# Patient Record
Sex: FEMALE | Race: WHITE | NOT HISPANIC OR LATINO | Employment: UNEMPLOYED | URBAN - NONMETROPOLITAN AREA
[De-identification: names, ages, dates, MRNs, and addresses within clinical notes are randomized per-mention and may not be internally consistent; named-entity substitution may affect disease eponyms.]

---

## 2017-04-23 ENCOUNTER — HOSPITAL ENCOUNTER (EMERGENCY)
Facility: HOSPITAL | Age: 32
Discharge: HOME | End: 2017-04-23
Attending: EMERGENCY MEDICINE | Admitting: EMERGENCY MEDICINE

## 2017-04-23 VITALS
DIASTOLIC BLOOD PRESSURE: 79 MMHG | HEART RATE: 78 BPM | HEIGHT: 60 IN | WEIGHT: 150 LBS | RESPIRATION RATE: 18 BRPM | TEMPERATURE: 97.7 F | SYSTOLIC BLOOD PRESSURE: 131 MMHG | BODY MASS INDEX: 29.45 KG/M2 | OXYGEN SATURATION: 99 %

## 2017-04-23 DIAGNOSIS — H11.32 SUBCONJUNCTIVAL HEMORRHAGE, LEFT: ICD-10-CM

## 2017-04-23 DIAGNOSIS — S05.02XA CORNEAL ABRASION, LEFT, INITIAL ENCOUNTER: Primary | ICD-10-CM

## 2017-04-23 PROCEDURE — 99282 EMERGENCY DEPT VISIT SF MDM: CPT

## 2017-04-23 RX ORDER — TETRACAINE HYDROCHLORIDE 5 MG/ML
2 SOLUTION OPHTHALMIC ONCE
Status: DISCONTINUED | OUTPATIENT
Start: 2017-04-23 | End: 2017-04-23 | Stop reason: HOSPADM

## 2017-04-23 RX ORDER — CIPROFLOXACIN HYDROCHLORIDE 3.5 MG/ML
1 SOLUTION/ DROPS TOPICAL 4 TIMES DAILY
Qty: 5 ML | Refills: 0 | Status: SHIPPED | OUTPATIENT
Start: 2017-04-23 | End: 2017-04-30

## 2017-04-23 RX ORDER — ERYTHROMYCIN 5 MG/G
OINTMENT OPHTHALMIC NIGHTLY
Qty: 1 G | Refills: 0 | Status: SHIPPED | OUTPATIENT
Start: 2017-04-23 | End: 2017-04-30

## 2017-04-23 NOTE — ED PROVIDER NOTES
Subjective   HPI Comments: 31-year-old female presenting with eye injury.  She states that prior to arrival she was laying back against a window when a mini blind fell striking her left eye.  Since then she's had pain to the eye, foreign body sensation, tearing.  Her vision is normal.  She denies any other complaints or concerns.  She has no facial pain.      Review of Systems   Constitutional: Negative for chills and fever.   HENT: Negative for congestion, rhinorrhea and sore throat.    Eyes: Positive for pain and redness. Negative for photophobia and visual disturbance.   Respiratory: Negative for cough and shortness of breath.    Cardiovascular: Negative for chest pain, palpitations and leg swelling.   Gastrointestinal: Negative for abdominal pain, diarrhea, nausea and vomiting.   Genitourinary: Negative for dysuria.   Musculoskeletal: Negative for arthralgias.   Skin: Negative for rash.   Neurological: Negative for weakness and numbness.   Psychiatric/Behavioral: Negative for behavioral problems.       Past Medical History:   Diagnosis Date   • ADD (attention deficit disorder)    • Depression        No Known Allergies    Past Surgical History:   Procedure Laterality Date   •  SECTION     • ORIF TIBIA FRACTURE Left        History reviewed. No pertinent family history.    Social History     Social History   • Marital status:      Spouse name: N/A   • Number of children: N/A   • Years of education: N/A     Social History Main Topics   • Smoking status: Current Every Day Smoker     Packs/day: 0.50     Types: Cigarettes   • Smokeless tobacco: None   • Alcohol use Yes      Comment: SOCIALLY    • Drug use: No   • Sexual activity: Not Asked     Other Topics Concern   • None     Social History Narrative   • None           Objective   Physical Exam   Constitutional: She is oriented to person, place, and time. She appears well-developed and well-nourished. No distress.   HENT:   Head: Normocephalic and  atraumatic.   Right Ear: External ear normal.   Left Ear: External ear normal.   Nose: Nose normal.   Mouth/Throat: Oropharynx is clear and moist.   Orbits and midface stable   Eyes: EOM are normal. Pupils are equal, round, and reactive to light.   Right eye is normal, left eye with subconjunctival hemorrhage medially, there is a triangular shaped corneal abrasion inferiorly, negative Tri sign, she had pain relief with tetracaine, lids flipped no foreign bodies noted   Neck: Normal range of motion. Neck supple.   Cardiovascular: Normal rate, regular rhythm, normal heart sounds and intact distal pulses.    Pulmonary/Chest: Effort normal and breath sounds normal. No respiratory distress.   Abdominal: Soft. Bowel sounds are normal. She exhibits no distension. There is no tenderness. There is no rebound and no guarding.   Musculoskeletal: Normal range of motion. She exhibits no edema, tenderness or deformity.   Neurological: She is alert and oriented to person, place, and time.   Skin: Skin is warm and dry. No rash noted.   Small contusion to the bridge of the nose   Psychiatric: She has a normal mood and affect. Her behavior is normal.   Nursing note and vitals reviewed.      Procedures         ED Course  ED Course                  MDM  Number of Diagnoses or Management Options  Corneal abrasion, left, initial encounter:   Subconjunctival hemorrhage, left:   Diagnosis management comments: 31-year-old female with eye injury.  Well-developed, well-nourished female in no distress with exam as above.  We'll treat with antibiotic ointment/drops for the corneal abrasion.  We'll have her follow-up with ophthalmology tomorrow or Tuesday at the latest.  Return precautions discussed.  She is comfortable with and understanding of the plan.  -eye drops    Ddx: eye injury, corneal abrasion, subconjunctival hemorrhage      Final diagnoses:   Corneal abrasion, left, initial encounter   Subconjunctival hemorrhage, left             Veto Hough MD  04/23/17 9435

## 2017-04-23 NOTE — DISCHARGE INSTRUCTIONS

## 2017-07-06 ENCOUNTER — HOSPITAL ENCOUNTER (EMERGENCY)
Facility: HOSPITAL | Age: 32
Discharge: HOME | End: 2017-07-06
Attending: EMERGENCY MEDICINE | Admitting: EMERGENCY MEDICINE

## 2017-07-06 ENCOUNTER — APPOINTMENT (OUTPATIENT)
Dept: GENERAL RADIOLOGY | Facility: HOSPITAL | Age: 32
End: 2017-07-06

## 2017-07-06 VITALS
HEART RATE: 108 BPM | WEIGHT: 145 LBS | TEMPERATURE: 99 F | RESPIRATION RATE: 18 BRPM | BODY MASS INDEX: 28.47 KG/M2 | OXYGEN SATURATION: 98 % | SYSTOLIC BLOOD PRESSURE: 137 MMHG | HEIGHT: 60 IN | DIASTOLIC BLOOD PRESSURE: 73 MMHG

## 2017-07-06 DIAGNOSIS — M54.16 LEFT LUMBAR RADICULOPATHY: Primary | ICD-10-CM

## 2017-07-06 PROCEDURE — 72100 X-RAY EXAM L-S SPINE 2/3 VWS: CPT

## 2017-07-06 PROCEDURE — 99283 EMERGENCY DEPT VISIT LOW MDM: CPT

## 2017-07-06 RX ORDER — NAPROXEN 500 MG/1
500 TABLET ORAL 2 TIMES DAILY PRN
Qty: 14 TABLET | Refills: 0 | Status: SHIPPED | OUTPATIENT
Start: 2017-07-06 | End: 2019-04-18 | Stop reason: HOSPADM

## 2017-07-06 RX ORDER — CYCLOBENZAPRINE HCL 10 MG
10 TABLET ORAL 3 TIMES DAILY
Qty: 15 TABLET | Refills: 0 | Status: SHIPPED | OUTPATIENT
Start: 2017-07-06 | End: 2019-04-18 | Stop reason: HOSPADM

## 2017-07-06 NOTE — ED PROVIDER NOTES
Subjective   HPI Comments: 31-year-old female presents with low back pain for 2 weeks.  She states the pain radiates down her left buttocks and left leg.  She states that she was running several weeks ago and felt a pull in her lower back area and also radiating to her left leg.  No loss of bowel or bladder function no fever no chills no urinary symptoms.      History provided by:  Patient   used: No        Review of Systems   Musculoskeletal: Positive for back pain.   All other systems reviewed and are negative.      Past Medical History:   Diagnosis Date   • ADD (attention deficit disorder)    • Depression        No Known Allergies    Past Surgical History:   Procedure Laterality Date   •  SECTION     • ORIF TIBIA FRACTURE Left        History reviewed. No pertinent family history.    Social History     Social History   • Marital status:      Spouse name: N/A   • Number of children: N/A   • Years of education: N/A     Social History Main Topics   • Smoking status: Current Every Day Smoker     Packs/day: 0.50     Types: Cigarettes   • Smokeless tobacco: None   • Alcohol use Yes      Comment: SOCIALLY    • Drug use: No   • Sexual activity: Yes     Other Topics Concern   • None     Social History Narrative   • None           Objective   Physical Exam   Constitutional: She is oriented to person, place, and time. She appears well-developed and well-nourished.   HENT:   Head: Normocephalic.   Eyes: EOM are normal. Pupils are equal, round, and reactive to light.   Neck: Normal range of motion. Neck supple.   Cardiovascular: Normal rate and regular rhythm.    Pulmonary/Chest: Effort normal and breath sounds normal.   Abdominal: Soft. Bowel sounds are normal.   Musculoskeletal: Normal range of motion.   Neurological: She is alert and oriented to person, place, and time. She has normal reflexes.   Skin: Skin is warm and dry.   Psychiatric: She has a normal mood and affect.   Nursing note  and vitals reviewed.      Procedures         ED Course  ED Course                  MDM    Final diagnoses:   Left lumbar radiculopathy            Rashad Rodriguez Jr., PA-C  07/06/17 1538

## 2017-07-12 ENCOUNTER — HOSPITAL ENCOUNTER (EMERGENCY)
Facility: HOSPITAL | Age: 32
Discharge: HOME | End: 2017-07-12
Attending: EMERGENCY MEDICINE | Admitting: EMERGENCY MEDICINE

## 2017-07-12 ENCOUNTER — APPOINTMENT (OUTPATIENT)
Dept: GENERAL RADIOLOGY | Facility: HOSPITAL | Age: 32
End: 2017-07-12

## 2017-07-12 VITALS
RESPIRATION RATE: 22 BRPM | DIASTOLIC BLOOD PRESSURE: 82 MMHG | TEMPERATURE: 98.2 F | OXYGEN SATURATION: 100 % | HEIGHT: 62 IN | SYSTOLIC BLOOD PRESSURE: 112 MMHG | WEIGHT: 150 LBS | HEART RATE: 68 BPM | BODY MASS INDEX: 27.6 KG/M2

## 2017-07-12 DIAGNOSIS — M79.605 LEG PAIN, POSTERIOR, LEFT: ICD-10-CM

## 2017-07-12 DIAGNOSIS — S39.012D LUMBAR STRAIN, SUBSEQUENT ENCOUNTER: Primary | ICD-10-CM

## 2017-07-12 PROCEDURE — 25010000002 KETOROLAC TROMETHAMINE PER 15 MG: Performed by: PHYSICIAN ASSISTANT

## 2017-07-12 PROCEDURE — 73590 X-RAY EXAM OF LOWER LEG: CPT

## 2017-07-12 PROCEDURE — 73552 X-RAY EXAM OF FEMUR 2/>: CPT

## 2017-07-12 PROCEDURE — 99283 EMERGENCY DEPT VISIT LOW MDM: CPT

## 2017-07-12 PROCEDURE — 96372 THER/PROPH/DIAG INJ SC/IM: CPT

## 2017-07-12 PROCEDURE — 25010000002 PROMETHAZINE PER 50 MG: Performed by: EMERGENCY MEDICINE

## 2017-07-12 PROCEDURE — 25010000002 MORPHINE PER 10 MG: Performed by: EMERGENCY MEDICINE

## 2017-07-12 RX ORDER — MORPHINE SULFATE 4 MG/ML
4 INJECTION, SOLUTION INTRAMUSCULAR; INTRAVENOUS ONCE
Status: COMPLETED | OUTPATIENT
Start: 2017-07-12 | End: 2017-07-12

## 2017-07-12 RX ORDER — KETOROLAC TROMETHAMINE 30 MG/ML
60 INJECTION, SOLUTION INTRAMUSCULAR; INTRAVENOUS ONCE
Status: COMPLETED | OUTPATIENT
Start: 2017-07-12 | End: 2017-07-12

## 2017-07-12 RX ORDER — PREDNISONE 20 MG/1
40 TABLET ORAL DAILY
Qty: 10 TABLET | Refills: 0 | Status: SHIPPED | OUTPATIENT
Start: 2017-07-12 | End: 2019-04-18 | Stop reason: HOSPADM

## 2017-07-12 RX ORDER — METAXALONE 800 MG/1
800 TABLET ORAL NIGHTLY PRN
Qty: 10 TABLET | Refills: 0 | Status: SHIPPED | OUTPATIENT
Start: 2017-07-12 | End: 2019-04-18 | Stop reason: HOSPADM

## 2017-07-12 RX ORDER — MELOXICAM 15 MG/1
15 TABLET ORAL DAILY
Qty: 10 TABLET | Refills: 0 | Status: SHIPPED | OUTPATIENT
Start: 2017-07-12 | End: 2019-04-18 | Stop reason: HOSPADM

## 2017-07-12 RX ORDER — PROMETHAZINE HYDROCHLORIDE 25 MG/ML
25 INJECTION, SOLUTION INTRAMUSCULAR; INTRAVENOUS ONCE
Status: COMPLETED | OUTPATIENT
Start: 2017-07-12 | End: 2017-07-12

## 2017-07-12 RX ADMIN — MORPHINE SULFATE 4 MG: 4 INJECTION, SOLUTION INTRAMUSCULAR; INTRAVENOUS at 15:11

## 2017-07-12 RX ADMIN — PROMETHAZINE HYDROCHLORIDE 25 MG: 25 INJECTION INTRAMUSCULAR; INTRAVENOUS at 15:11

## 2017-07-12 RX ADMIN — KETOROLAC TROMETHAMINE 60 MG: 60 INJECTION, SOLUTION INTRAMUSCULAR at 14:25

## 2017-07-12 NOTE — DISCHARGE INSTRUCTIONS
As mentioned to you, this could be sciatica from your back (A pinched nerve from your back).  You may need to have an MRI done of your back to figure out the cause of your pain.  You should follow-up with your family doctor or Dr. Marcus, orthopedic specialist, in one to 2 days for further workup, treatment and evaluation.      Discontinue naproxen and Flexeril as we are switching you to new medications.      No running, jumping, jogging, sports or heavy lifting until cleared by your regular doctors.      As mentioned to you in the ER today, you have a broken screw in your lower leg.  You will need to see Dr. Marcus, orthopedic specialist, or the [facility] for further workup, treatment and evaluation of this problem.  Call for appointments.

## 2017-07-12 NOTE — ED PROVIDER NOTES
Subjective   HPI Comments: 31-year-old female apparently was jogging about 4 weeks ago and thinks she may have torn her hamstrings muscle.  She is here with aching/burning, severe pain in her left lumbar region, left buttock, left posterior thigh and left lower leg for the past 4 weeks, progressively worsening.  No incontinence, saddle anesthesia or urinary retention.  No IV drug use.  No fever.  No previous history of back problems.  She has had her left lower leg operated on in the past for a fracture requiring hardware at .  This was performed in .  No new injury since that time.  She was seen here on 17 by Rashad Rodriguez and written a prescription for naproxen and Flexeril and diagnosed with lumbar radiculopathy after having a negative L-spine x-ray.  She did not follow-up with her family doctor as directed.    Aggravating factors: Movement.  Alleviating factors: Laying on her right side.  Treatment prior to arrival: Naproxen, Flexeril, seen in our ER and had a negative back x-ray on 17.  LMP 3 weeks ago.  Denies pregnancy.      History provided by:  Patient  History limited by:  Acuity of condition   used: No        Review of Systems   Constitutional: Negative.    HENT: Negative.    Eyes: Negative.    Respiratory: Negative.    Cardiovascular: Negative.  Negative for chest pain.   Gastrointestinal: Negative.  Negative for abdominal pain.   Endocrine: Negative.    Genitourinary: Negative for dysuria.   Musculoskeletal: Positive for arthralgias, back pain and myalgias.   Skin: Negative.    Allergic/Immunologic: Negative.    Neurological: Negative.    Hematological: Negative.    Psychiatric/Behavioral: Negative.    All other systems reviewed and are negative.      Past Medical History:   Diagnosis Date   • ADD (attention deficit disorder)    • Depression        No Known Allergies    Past Surgical History:   Procedure Laterality Date   •  SECTION     • ORIF TIBIA FRACTURE  Left        History reviewed. No pertinent family history.    Social History     Social History   • Marital status:      Spouse name: N/A   • Number of children: N/A   • Years of education: N/A     Social History Main Topics   • Smoking status: Current Every Day Smoker     Packs/day: 0.50     Types: Cigarettes   • Smokeless tobacco: None   • Alcohol use Yes      Comment: SOCIALLY    • Drug use: No   • Sexual activity: Yes     Other Topics Concern   • None     Social History Narrative           Objective   Physical Exam   Constitutional: She is oriented to person, place, and time. She appears well-developed and well-nourished. No distress.   HENT:   Head: Normocephalic and atraumatic.   Right Ear: External ear normal.   Left Ear: External ear normal.   Eyes: EOM are normal. Pupils are equal, round, and reactive to light.   Neck: Normal range of motion. Neck supple.   Cardiovascular: Normal rate, regular rhythm and normal heart sounds.    Pulmonary/Chest: Effort normal and breath sounds normal. No stridor. She has no wheezes. She exhibits no tenderness.   Musculoskeletal: Normal range of motion. She exhibits no edema.   The left sacroiliac joint, left hamstrings and left lower leg are all tender to palpation without muscle shortening, deformity, rash, redness or heat.  Neurovascular and skin are intact to the left lower extremity.  Leg examined with shanna Wilkins present.   Neurological: She is alert and oriented to person, place, and time.   Intact motor, sensation, strength to bilateral lower extremity.   Skin: Skin is warm and dry. No rash noted. She is not diaphoretic.   Psychiatric: She has a normal mood and affect. Her behavior is normal. Judgment and thought content normal.   Nursing note and vitals reviewed.      Procedures         ED Course  ED Course                  MDM  Number of Diagnoses or Management Options  Leg pain, posterior, left: new and requires workup  Lumbar strain, subsequent encounter:  new and requires workup     Amount and/or Complexity of Data Reviewed  Tests in the radiology section of CPT®: reviewed and ordered  Discuss the patient with other providers: yes    Risk of Complications, Morbidity, and/or Mortality  Presenting problems: moderate  Diagnostic procedures: low  Management options: moderate  General comments: I explained to the patient that this could be sciatica from her back or simply a muscular strain.  I also explained to her that this could be the broken screw in her lower leg causing some of this discomfort.  She understands that she needs to see her family doctor and Dr. Marcus, orthopedic specialist, for further workup, treatment and evaluation which may need to include an MRI.    Patient Progress  Patient progress: stable      Final diagnoses:   Lumbar strain, subsequent encounter   Leg pain, posterior, left            Sammi Damon PA-C  07/12/17 1635

## 2017-07-16 ENCOUNTER — HOSPITAL ENCOUNTER (EMERGENCY)
Facility: HOSPITAL | Age: 32
Discharge: HOME | End: 2017-07-16
Attending: EMERGENCY MEDICINE | Admitting: EMERGENCY MEDICINE

## 2017-07-16 VITALS
DIASTOLIC BLOOD PRESSURE: 50 MMHG | SYSTOLIC BLOOD PRESSURE: 130 MMHG | HEART RATE: 80 BPM | TEMPERATURE: 98.2 F | WEIGHT: 145 LBS | HEIGHT: 60 IN | RESPIRATION RATE: 18 BRPM | OXYGEN SATURATION: 98 % | BODY MASS INDEX: 28.47 KG/M2

## 2017-07-16 DIAGNOSIS — M79.605 PAIN OF LEFT LOWER EXTREMITY: Primary | ICD-10-CM

## 2017-07-16 PROCEDURE — 96372 THER/PROPH/DIAG INJ SC/IM: CPT

## 2017-07-16 PROCEDURE — 25010000002 HALOPERIDOL LACTATE PER 5 MG: Performed by: EMERGENCY MEDICINE

## 2017-07-16 PROCEDURE — 99283 EMERGENCY DEPT VISIT LOW MDM: CPT

## 2017-07-16 PROCEDURE — 25010000002 KETOROLAC TROMETHAMINE PER 15 MG: Performed by: EMERGENCY MEDICINE

## 2017-07-16 PROCEDURE — 25010000002 METHYLPREDNISOLONE PER 125 MG: Performed by: EMERGENCY MEDICINE

## 2017-07-16 RX ORDER — HALOPERIDOL 5 MG/ML
5 INJECTION INTRAMUSCULAR ONCE
Status: COMPLETED | OUTPATIENT
Start: 2017-07-16 | End: 2017-07-16

## 2017-07-16 RX ORDER — METHOCARBAMOL 500 MG/1
500 TABLET, FILM COATED ORAL 3 TIMES DAILY PRN
Qty: 21 TABLET | Refills: 0 | Status: SHIPPED | OUTPATIENT
Start: 2017-07-16 | End: 2019-04-18 | Stop reason: HOSPADM

## 2017-07-16 RX ORDER — IBUPROFEN 600 MG/1
600 TABLET ORAL EVERY 8 HOURS PRN
Qty: 21 TABLET | Refills: 0 | Status: SHIPPED | OUTPATIENT
Start: 2017-07-16 | End: 2019-04-18 | Stop reason: HOSPADM

## 2017-07-16 RX ORDER — METHYLPREDNISOLONE SODIUM SUCCINATE 125 MG/2ML
80 INJECTION, POWDER, LYOPHILIZED, FOR SOLUTION INTRAMUSCULAR; INTRAVENOUS ONCE
Status: COMPLETED | OUTPATIENT
Start: 2017-07-16 | End: 2017-07-16

## 2017-07-16 RX ORDER — METHYLPREDNISOLONE 4 MG/1
TABLET ORAL
Qty: 21 TABLET | Refills: 0 | Status: SHIPPED | OUTPATIENT
Start: 2017-07-16 | End: 2019-04-18 | Stop reason: HOSPADM

## 2017-07-16 RX ORDER — KETOROLAC TROMETHAMINE 30 MG/ML
60 INJECTION, SOLUTION INTRAMUSCULAR; INTRAVENOUS ONCE
Status: COMPLETED | OUTPATIENT
Start: 2017-07-16 | End: 2017-07-16

## 2017-07-16 RX ADMIN — METHYLPREDNISOLONE SODIUM SUCCINATE 80 MG: 125 INJECTION, POWDER, FOR SOLUTION INTRAMUSCULAR; INTRAVENOUS at 13:13

## 2017-07-16 RX ADMIN — HALOPERIDOL LACTATE 5 MG: 5 INJECTION, SOLUTION INTRAMUSCULAR at 13:18

## 2017-07-16 RX ADMIN — KETOROLAC TROMETHAMINE 60 MG: 60 INJECTION, SOLUTION INTRAMUSCULAR at 13:14

## 2017-07-16 NOTE — ED PROVIDER NOTES
TRIAGE CHIEF COMPLAINT:     Nursing and triage notes reviewed    Chief Complaint   Patient presents with   • Leg Pain      HPI: Alejandra Torres is a 31 y.o. female who presents to the emergency department complaining of Pain in her left thigh.  Patient describes a sharp and burning type pain in her posterior lateral left thigh.  Symptoms are made worse by trying to walk or bending her leg at the knee joint.  Pain radiates up and down her left leg from her left hip to her left knee.  Patient was seen in the emergency department 2 times in the last week for similar symptoms.  X-rays at that time revealed a loose screw and some hardware which is an old finding.  No other abnormalities were seen.  Patient had been referred to orthopedics for further evaluation but had not followed up.  She is not having numbness or tingling in her lower extremity.  Denies history of injury.  Patient states pain started initially when she was running.  States the medicine has helped her thus far.     REVIEW OF SYSTEMS: Otherwise negative unless stated above     PAST MEDICAL HISTORY:   Past Medical History:   Diagnosis Date   • ADD (attention deficit disorder)    • Depression         FAMILY HISTORY:   History reviewed. No pertinent family history.     SOCIAL HISTORY:   Social History     Social History   • Marital status:      Spouse name: N/A   • Number of children: N/A   • Years of education: N/A     Occupational History   • Not on file.     Social History Main Topics   • Smoking status: Current Every Day Smoker     Packs/day: 0.50     Types: Cigarettes   • Smokeless tobacco: Not on file   • Alcohol use Yes      Comment: SOCIALLY    • Drug use: No   • Sexual activity: Yes     Other Topics Concern   • Not on file     Social History Narrative        SURGICAL HISTORY:   Past Surgical History:   Procedure Laterality Date   •  SECTION     • ORIF TIBIA FRACTURE Left         CURRENT MEDICATIONS:      Medication List      ASK  your doctor about these medications          cyclobenzaprine 10 MG tablet   Commonly known as:  FLEXERIL   Take 1 tablet by mouth 3 (Three) Times a Day.       meloxicam 15 MG tablet   Commonly known as:  MOBIC   Take 1 tablet by mouth Daily.       metaxalone 800 MG tablet   Commonly known as:  SKELAXIN   Take 1 tablet by mouth At Night As Needed for Muscle Spasms.       naproxen 500 MG tablet   Commonly known as:  NAPROSYN   Take 1 tablet by mouth 2 (Two) Times a Day As Needed for Mild Pain (1-3)   for up to 14 doses.       predniSONE 20 MG tablet   Commonly known as:  DELTASONE   Take 2 tablets by mouth Daily.            ALLERGIES: Review of patient's allergies indicates no known allergies.     PHYSICAL EXAM:   VITAL SIGNS:   Vitals:    07/16/17 1208   BP: 136/48   Pulse: 104   Resp: 18   Temp: 98.5 °F (36.9 °C)   SpO2: 98%      CONSTITUTIONAL: Awake, oriented, appears non-toxic   HENT: Atraumatic, normocephalic, oral mucosa pink and moist, airway patent.  EYES: Conjunctiva clear  NECK: Trachea midline   CARDIOVASCULAR: Normal heart rate, Normal rhythm, No murmurs, rubs, gallops   PULMONARY/CHEST: Clear to auscultation, no rhonchi, wheezes, or rales. Symmetrical breath sounds.  ABDOMINAL: Non-distended, soft, non-tender - no rebound or guarding.  NEUROLOGIC: Non-focal, moving all four extremities, no gross sensory or motor deficits.   EXTREMITIES: No clubbing, cyanosis, or edema.  There is no obvious swelling of the left leg.  No overlying erythema, ecchymoses, or other abnormalities are seen.  Pain can be reproduced with palpation at the distal posterior thigh.  Can also be reproduced with any attempt at movement at the hip or knee joint.  Distal pulses and sensation are intact.   SKIN: Warm, Dry, No erythema, No rash     ED COURSE / MEDICAL DECISION MAKING:   Alejandra Torres is a 31 y.o. female who presents to the emergency department for evaluation of lower extremity discomfort.  Patient appears mildly  uncomfortable on arrival.  Vital signs are stable.  Exam does reveal some pain in the lower extremity with attempt at movement.  The leg is neurovascularly intact.  There is no noticeable swelling, bruising, or overlying erythema.  Speaking with the patient we decided that given her symptoms are some concern for possible muscular injury which cannot appropriately be evaluated in the emergency department as she might require an MRI.  It was not felt that x-rays would be of use given she had some recently.  Patient also had imaging of her back.  We'll give trial of some medications to help her symptomatically but will refer her to orthopedics for further evaluation.  Patient was agreeable to this plan and discharged in good condition with return precautions.     DECISION TO DISCHARGE/ADMIT: see ED care timeline     FINAL IMPRESSION:   1 -- leg pain   2 --   3 --     Electronically signed by: Carito Bowling MD, 7/16/2017 12:52 PM       Carito Bowling MD  07/16/17 5375

## 2017-07-16 NOTE — DISCHARGE INSTRUCTIONS
"Most recent vital signs: /48 (BP Location: Left arm, Patient Position: Sitting)  Pulse 104  Temp 98.5 °F (36.9 °C)  Resp 18  Ht 60\" (152.4 cm)  Wt 145 lb (65.8 kg)  LMP 06/22/2017 (Exact Date)  SpO2 98%  BMI 28.32 kg/m2       --PLEASE READ THESE DIRECTIONS IN FULL--     Our goal is to provide the best care we can AND to find any medical or surgical emergency while you are in the ER. If a medical or surgical emergency was not identified during your visit (or if the issue was resolved during the visit), following these directions for follow-up with a primary care provider and/or specialists and following the return precautions will be the safest and most effective way to protect your health after leaving the emergency room.     Therefore, in addition to the conversation we had in the room, please read all of the information in these discharge instructions, take medications as directed, and follow-up as directed.     You should seek immediate medical help for:     Worsening pain that is not helped with prescribed pain medicines and/or the recommended doses of over the counter pain medicines such as acetaminophen (Tylenol) or ibuprofen (Motrin/Advil)*.   New or worsening chest pain or trouble breathing   New or worsening headache, confusion, weakness, or visual changes   New or worsening fever (>100.4 F) that does not improve with the recommended doses of over the counter fever treatments such as acetaminophen (Tylenol) or ibuprofen (Motrin/Advil)* for more than a few hours.   Any other concerns that you feel could be life, limb, or eye-sight threatening     As a reminder, if you received any medicines or prescriptions for medicines that may be sedating (\"narcotics\", \"opioids\"), do NOT:   - operate any vehicles   - take if you are already tired   - take if you have had or plan to have alcohol   - perform other responsibilities that require your full attention.     Common medications include, but are not " limited to:   Opiates: Morphine, Norco, Lortab, Vicodin, Percocet, oxycodone, hydrocodone, Dilaudid, hydromorphone   Benzodiazepines: Ativan, Valium, alprazolam, lorazepam, diazepam,   Other sedating medications: promethazine, Phenergan, trazodone, Benadryl, hydroxyzine, Vistaril, diphenhydramine, zolpidem, and Ambien     *If you have any history of GI (stomach/intestinal) bleeding, allergic reactions, kidney problems, and/or certain heart problems or there is any chance you could be pregnant, and have been told to avoid NSAIDs (ibuprofen, naproxen, Aleve, Motrin, Advil) please avoid these medications. Please remember that prescribed pain medicines often contain Tylenol/acetaminophen, so make sure your total daily (24 hour) intake does not exceed 4 grams or 4000mg.

## 2017-07-30 ENCOUNTER — APPOINTMENT (OUTPATIENT)
Dept: GENERAL RADIOLOGY | Facility: HOSPITAL | Age: 32
End: 2017-07-30

## 2017-07-30 ENCOUNTER — HOSPITAL ENCOUNTER (EMERGENCY)
Facility: HOSPITAL | Age: 32
Discharge: HOME | End: 2017-07-30
Attending: EMERGENCY MEDICINE | Admitting: EMERGENCY MEDICINE

## 2017-07-30 VITALS
HEART RATE: 94 BPM | HEIGHT: 60 IN | BODY MASS INDEX: 29.45 KG/M2 | SYSTOLIC BLOOD PRESSURE: 140 MMHG | TEMPERATURE: 98.5 F | WEIGHT: 150 LBS | RESPIRATION RATE: 18 BRPM | OXYGEN SATURATION: 92 % | DIASTOLIC BLOOD PRESSURE: 90 MMHG

## 2017-07-30 DIAGNOSIS — M25.552 LEFT HIP PAIN: Primary | ICD-10-CM

## 2017-07-30 LAB — B-HCG UR QL: NEGATIVE

## 2017-07-30 PROCEDURE — 73502 X-RAY EXAM HIP UNI 2-3 VIEWS: CPT

## 2017-07-30 PROCEDURE — 81025 URINE PREGNANCY TEST: CPT | Performed by: EMERGENCY MEDICINE

## 2017-07-30 PROCEDURE — 99283 EMERGENCY DEPT VISIT LOW MDM: CPT

## 2019-04-18 ENCOUNTER — HOSPITAL ENCOUNTER (EMERGENCY)
Facility: HOSPITAL | Age: 34
Discharge: HOME | End: 2019-04-19
Attending: EMERGENCY MEDICINE | Admitting: EMERGENCY MEDICINE

## 2019-04-18 ENCOUNTER — APPOINTMENT (OUTPATIENT)
Dept: ULTRASOUND IMAGING | Facility: HOSPITAL | Age: 34
End: 2019-04-18

## 2019-04-18 VITALS
HEIGHT: 60 IN | OXYGEN SATURATION: 99 % | TEMPERATURE: 97.4 F | SYSTOLIC BLOOD PRESSURE: 152 MMHG | WEIGHT: 160 LBS | HEART RATE: 98 BPM | BODY MASS INDEX: 31.41 KG/M2 | DIASTOLIC BLOOD PRESSURE: 73 MMHG | RESPIRATION RATE: 20 BRPM

## 2019-04-18 DIAGNOSIS — L30.9 DERMATITIS: Primary | ICD-10-CM

## 2019-04-18 DIAGNOSIS — Z33.1: ICD-10-CM

## 2019-04-18 LAB — B-HCG UR QL: POSITIVE

## 2019-04-18 PROCEDURE — 81025 URINE PREGNANCY TEST: CPT | Performed by: PHYSICIAN ASSISTANT

## 2019-04-18 PROCEDURE — 99283 EMERGENCY DEPT VISIT LOW MDM: CPT

## 2019-04-18 PROCEDURE — 76817 TRANSVAGINAL US OBSTETRIC: CPT

## 2019-04-18 RX ORDER — PNV NO.95/FERROUS FUM/FOLIC AC 28MG-0.8MG
1 TABLET ORAL DAILY
Qty: 30 TABLET | Refills: 0 | Status: SHIPPED | OUTPATIENT
Start: 2019-04-18 | End: 2019-04-25

## 2019-04-18 RX ORDER — DIPHENHYDRAMINE HCL 25 MG
25 TABLET ORAL 2 TIMES DAILY PRN
Qty: 12 TABLET | Refills: 0 | Status: SHIPPED | OUTPATIENT
Start: 2019-04-18 | End: 2019-04-25

## 2019-04-18 RX ORDER — HYDROXYZINE PAMOATE 25 MG/1
25 CAPSULE ORAL ONCE
Status: DISCONTINUED | OUTPATIENT
Start: 2019-04-18 | End: 2019-04-18

## 2019-04-25 ENCOUNTER — INITIAL PRENATAL (OUTPATIENT)
Dept: OBSTETRICS AND GYNECOLOGY | Facility: CLINIC | Age: 34
End: 2019-04-25

## 2019-04-25 ENCOUNTER — HOSPITAL ENCOUNTER (OUTPATIENT)
Facility: HOSPITAL | Age: 34
Discharge: HOME | End: 2019-04-25
Attending: NURSE PRACTITIONER | Admitting: MIDWIFE

## 2019-04-25 VITALS
BODY MASS INDEX: 33.38 KG/M2 | SYSTOLIC BLOOD PRESSURE: 103 MMHG | HEIGHT: 60 IN | OXYGEN SATURATION: 100 % | HEART RATE: 99 BPM | TEMPERATURE: 98.1 F | DIASTOLIC BLOOD PRESSURE: 86 MMHG | RESPIRATION RATE: 14 BRPM | WEIGHT: 170 LBS

## 2019-04-25 VITALS — SYSTOLIC BLOOD PRESSURE: 156 MMHG | DIASTOLIC BLOOD PRESSURE: 102 MMHG | BODY MASS INDEX: 34.18 KG/M2 | WEIGHT: 175 LBS

## 2019-04-25 DIAGNOSIS — O09.899 HX OF PRETERM DELIVERY, CURRENTLY PREGNANT: ICD-10-CM

## 2019-04-25 DIAGNOSIS — O09.33 LATE PRENATAL CARE AFFECTING PREGNANCY IN THIRD TRIMESTER: ICD-10-CM

## 2019-04-25 DIAGNOSIS — Z36.89 SCREENING, ANTENATAL, FOR FETAL ANATOMIC SURVEY: Primary | ICD-10-CM

## 2019-04-25 DIAGNOSIS — Z98.891 HX OF CESAREAN SECTION: ICD-10-CM

## 2019-04-25 DIAGNOSIS — O16.3 HYPERTENSION AFFECTING PREGNANCY IN THIRD TRIMESTER: ICD-10-CM

## 2019-04-25 DIAGNOSIS — O09.299 HX OF PREECLAMPSIA, PRIOR PREGNANCY, CURRENTLY PREGNANT: ICD-10-CM

## 2019-04-25 PROBLEM — O09.30 LATE PRENATAL CARE AFFECTING PREGNANCY: Status: ACTIVE | Noted: 2019-04-25

## 2019-04-25 PROBLEM — O16.9 HYPERTENSION AFFECTING PREGNANCY: Status: ACTIVE | Noted: 2019-04-25

## 2019-04-25 LAB
ABO GROUP BLD: NORMAL
ABO GROUP BLD: NORMAL
ALBUMIN SERPL-MCNC: 3.3 G/DL (ref 3.5–5)
ALBUMIN/GLOB SERPL: 1 G/DL (ref 1–2)
ALP SERPL-CCNC: 156 U/L (ref 38–126)
ALT SERPL W P-5'-P-CCNC: 72 U/L (ref 13–69)
AMPHET+METHAMPHET UR QL: POSITIVE
AMPHETAMINES UR QL: POSITIVE
ANION GAP SERPL CALCULATED.3IONS-SCNC: 9.9 MMOL/L (ref 10–20)
AST SERPL-CCNC: 86 U/L (ref 15–46)
BACTERIA UR QL AUTO: ABNORMAL /HPF
BARBITURATES UR QL SCN: NEGATIVE
BASOPHILS # BLD AUTO: 0.02 10*3/MM3 (ref 0–0.2)
BASOPHILS NFR BLD AUTO: 0.2 % (ref 0–1.5)
BENZODIAZ UR QL SCN: NEGATIVE
BILIRUB SERPL-MCNC: 1.4 MG/DL (ref 0.2–1.3)
BILIRUB UR QL STRIP: ABNORMAL
BLD GP AB SCN SERPL QL: NEGATIVE
BUN BLD-MCNC: 7 MG/DL (ref 7–20)
BUN/CREAT SERPL: 11.7 (ref 7.1–23.5)
BUPRENORPHINE SERPL-MCNC: NEGATIVE NG/ML
CALCIUM SPEC-SCNC: 8.5 MG/DL (ref 8.4–10.2)
CANNABINOIDS SERPL QL: NEGATIVE
CHLORIDE SERPL-SCNC: 98 MMOL/L (ref 98–107)
CLARITY UR: ABNORMAL
CO2 SERPL-SCNC: 29 MMOL/L (ref 26–30)
COCAINE UR QL: NEGATIVE
COLOR UR: ABNORMAL
CREAT BLD-MCNC: 0.6 MG/DL (ref 0.6–1.3)
CREAT UR-MCNC: 132.2 MG/DL
DEPRECATED RDW RBC AUTO: 47.1 FL (ref 37–54)
EOSINOPHIL # BLD AUTO: 0.11 10*3/MM3 (ref 0–0.4)
EOSINOPHIL NFR BLD AUTO: 1 % (ref 0.3–6.2)
ERYTHROCYTE [DISTWIDTH] IN BLOOD BY AUTOMATED COUNT: 13.7 % (ref 12.3–15.4)
GFR SERPL CREATININE-BSD FRML MDRD: 114 ML/MIN/1.73
GLOBULIN UR ELPH-MCNC: 3.4 GM/DL
GLUCOSE BLD-MCNC: 76 MG/DL (ref 74–98)
GLUCOSE UR STRIP-MCNC: NEGATIVE MG/DL
HCT VFR BLD AUTO: 40.1 % (ref 34–46.6)
HGB BLD-MCNC: 13.7 G/DL (ref 12–15.9)
HGB UR QL STRIP.AUTO: NEGATIVE
HYALINE CASTS UR QL AUTO: ABNORMAL /LPF
IMM GRANULOCYTES # BLD AUTO: 0.1 10*3/MM3 (ref 0–0.05)
IMM GRANULOCYTES NFR BLD AUTO: 0.9 % (ref 0–0.5)
KETONES UR QL STRIP: NEGATIVE
LEUKOCYTE ESTERASE UR QL STRIP.AUTO: ABNORMAL
LYMPHOCYTES # BLD AUTO: 2.26 10*3/MM3 (ref 0.7–3.1)
LYMPHOCYTES NFR BLD AUTO: 19.7 % (ref 19.6–45.3)
MCH RBC QN AUTO: 31.6 PG (ref 26.6–33)
MCHC RBC AUTO-ENTMCNC: 34.2 G/DL (ref 31.5–35.7)
MCV RBC AUTO: 92.4 FL (ref 79–97)
METHADONE UR QL SCN: NEGATIVE
MONOCYTES # BLD AUTO: 1.02 10*3/MM3 (ref 0.1–0.9)
MONOCYTES NFR BLD AUTO: 8.9 % (ref 5–12)
NEUTROPHILS # BLD AUTO: 7.97 10*3/MM3 (ref 1.7–7)
NEUTROPHILS NFR BLD AUTO: 69.3 % (ref 42.7–76)
NITRITE UR QL STRIP: POSITIVE
NRBC BLD AUTO-RTO: 0 /100 WBC (ref 0–0.2)
OPIATES UR QL: NEGATIVE
OXYCODONE UR QL SCN: NEGATIVE
PCP UR QL SCN: NEGATIVE
PH UR STRIP.AUTO: 7.5 [PH] (ref 5–8)
PLATELET # BLD AUTO: 274 10*3/MM3 (ref 140–450)
PMV BLD AUTO: 10.1 FL (ref 6–12)
POTASSIUM BLD-SCNC: 3.9 MMOL/L (ref 3.5–5.1)
PROPOXYPH UR QL: NEGATIVE
PROT SERPL-MCNC: 6.7 G/DL (ref 6.3–8.2)
PROT UR QL STRIP: ABNORMAL
PROT UR-MCNC: 7 MG/DL (ref 0–11.9)
PROT/CREAT UR: 53 MG/G CREA
RBC # BLD AUTO: 4.34 10*6/MM3 (ref 3.77–5.28)
RBC # UR: ABNORMAL /HPF
REF LAB TEST METHOD: ABNORMAL
RH BLD: NEGATIVE
RH BLD: NEGATIVE
SODIUM BLD-SCNC: 133 MMOL/L (ref 137–145)
SP GR UR STRIP: 1.02 (ref 1–1.03)
SQUAMOUS #/AREA URNS HPF: ABNORMAL /HPF
T&S EXPIRATION DATE: NORMAL
TRANS CELLS #/AREA URNS HPF: ABNORMAL /HPF
TRICYCLICS UR QL SCN: NEGATIVE
UROBILINOGEN UR QL STRIP: ABNORMAL
WBC NRBC COR # BLD: 11.48 10*3/MM3 (ref 3.4–10.8)
WBC UR QL AUTO: ABNORMAL /HPF
YEAST URNS QL MICRO: ABNORMAL /HPF

## 2019-04-25 PROCEDURE — 86901 BLOOD TYPING SEROLOGIC RH(D): CPT

## 2019-04-25 PROCEDURE — 87186 SC STD MICRODIL/AGAR DIL: CPT | Performed by: MIDWIFE

## 2019-04-25 PROCEDURE — 85025 COMPLETE CBC W/AUTO DIFF WBC: CPT | Performed by: NURSE PRACTITIONER

## 2019-04-25 PROCEDURE — 87536 HIV-1 QUANT&REVRSE TRNSCRPJ: CPT | Performed by: MIDWIFE

## 2019-04-25 PROCEDURE — 86901 BLOOD TYPING SEROLOGIC RH(D): CPT | Performed by: NURSE PRACTITIONER

## 2019-04-25 PROCEDURE — 87077 CULTURE AEROBIC IDENTIFY: CPT | Performed by: MIDWIFE

## 2019-04-25 PROCEDURE — 86900 BLOOD TYPING SEROLOGIC ABO: CPT

## 2019-04-25 PROCEDURE — 82570 ASSAY OF URINE CREATININE: CPT | Performed by: NURSE PRACTITIONER

## 2019-04-25 PROCEDURE — 80053 COMPREHEN METABOLIC PANEL: CPT | Performed by: MIDWIFE

## 2019-04-25 PROCEDURE — 86803 HEPATITIS C AB TEST: CPT | Performed by: MIDWIFE

## 2019-04-25 PROCEDURE — 80306 DRUG TEST PRSMV INSTRMNT: CPT | Performed by: MIDWIFE

## 2019-04-25 PROCEDURE — 84156 ASSAY OF PROTEIN URINE: CPT | Performed by: NURSE PRACTITIONER

## 2019-04-25 PROCEDURE — 87521 HEPATITIS C PROBE&RVRS TRNSC: CPT | Performed by: MIDWIFE

## 2019-04-25 PROCEDURE — 87086 URINE CULTURE/COLONY COUNT: CPT | Performed by: MIDWIFE

## 2019-04-25 PROCEDURE — 86762 RUBELLA ANTIBODY: CPT | Performed by: MIDWIFE

## 2019-04-25 PROCEDURE — 87340 HEPATITIS B SURFACE AG IA: CPT | Performed by: MIDWIFE

## 2019-04-25 PROCEDURE — 99203 OFFICE O/P NEW LOW 30 MIN: CPT | Performed by: MIDWIFE

## 2019-04-25 PROCEDURE — 86900 BLOOD TYPING SEROLOGIC ABO: CPT | Performed by: NURSE PRACTITIONER

## 2019-04-25 PROCEDURE — G0463 HOSPITAL OUTPT CLINIC VISIT: HCPCS

## 2019-04-25 PROCEDURE — 86850 RBC ANTIBODY SCREEN: CPT | Performed by: NURSE PRACTITIONER

## 2019-04-25 PROCEDURE — 36415 COLL VENOUS BLD VENIPUNCTURE: CPT | Performed by: MIDWIFE

## 2019-04-25 PROCEDURE — 81001 URINALYSIS AUTO W/SCOPE: CPT | Performed by: MIDWIFE

## 2019-04-25 RX ORDER — NITROFURANTOIN 25; 75 MG/1; MG/1
100 CAPSULE ORAL 2 TIMES DAILY
Qty: 14 CAPSULE | Refills: 0 | Status: SHIPPED | OUTPATIENT
Start: 2019-04-25 | End: 2019-05-02

## 2019-04-25 RX ORDER — NITROFURANTOIN 25; 75 MG/1; MG/1
100 CAPSULE ORAL EVERY 12 HOURS SCHEDULED
Status: DISCONTINUED | OUTPATIENT
Start: 2019-04-25 | End: 2019-04-25 | Stop reason: HOSPADM

## 2019-04-25 RX ADMIN — NITROFURANTOIN MONOHYDRATE/MACROCRYSTALLINE 100 MG: 25; 75 CAPSULE ORAL at 16:55

## 2019-04-25 NOTE — NON STRESS TEST
Triage Note - Nursing Documentation  Labor and Delivery Admission Log    Alejandra Torres  : 1985  MRN: [id number]  CSN: [id number]    Date in / Time in:  2019  Time in: 1455    Date out / Time out:    Time out: 2590    Nurse: Serina Kirby RN    Patient Info: She is a 33 y.o. year old  at 28w0d with an LORE of 2019, by Ultrasound who was seen on the [facility].    Chief Complaint:   Chief Complaint   Patient presents with   • Non-stress Test     Hx pre-eclampsia, Late entry to care, sent from office       Provider Instructions / Disposition: Active fetus, 's, no co pain, Labs obtained, macrobid x 1,     Patient Active Problem List   Diagnosis   • Late prenatal care affecting pregnancy   • Hx of  section   • Hx of preeclampsia, prior pregnancy, currently pregnant   • Hx of  delivery, currently pregnant   • Hypertension affecting pregnancy       NST Documentation (Only applicable > 32 weeks):

## 2019-04-25 NOTE — PROGRESS NOTES
Subjective     Chief Complaint   Patient presents with   • Initial Prenatal Visit     unsure of LMP, 28w0d by scan today, no complaints        Alejandra Yakelin Torres is a 33 y.o. .  No LMP recorded (lmp unknown). Patient is pregnant..  She presents to be seen to initiate prenatal care with our practice. She just found out a few weeks ago that she is pregnant after going to ED for a rash. She has felt fetal movement since then. Her first pregnancy was complicated by severe preeclampsia and she delivered by CSection @ 28 weeks in . She states she was on bedrest 4 months prior to that. Boyfriend is supportive but currently incarcerated. She denies having any other problems in this pregnancy.    The following portions of the patient's history were reviewed and updated as appropriate:vital signs, allergies, current medications, past medical history, past social history, past surgical history and problem list.    Review of Systems -   BP (!) 156/102   Wt 79.4 kg (175 lb)   LMP  (LMP Unknown)   BMI 34.18 kg/m²    Gastrointestinal: denies nausea and vomiting, denies constipation  Genitourinary: denies frequency, urgency, or burning with urination  All other systems were reviewed and are negative    Objective     Physical Exam  Constitutional   The patient is awake, alert, well developed. She is upset and tearful.  Neck   The neck is supple and the trachea is midline. The thyroid is not enlarged and there are no palpable nodules.   Respiratory  The patient is relaxed and breathes without effort.   Lungs CTAB  Cardiovascular  Normal rate and rhythm is regular without murmur -  Negative LE pitting edema  Gastrointestinal   The abdomen is gravid - soft - non tender.  No umbilical hernia  Genitourinary   - External Genitalia without erythema, lesions, or masses  -Vagina - There is no abnormal vaginal discharge.   -Cervix is without cervical motion tenderness, closed thick  Uterus - S=D  Musculoskeletal  Normal gait,  no joint pain or swelling  Extremities  Full ROM. No cyanosis or edema  Psychiatric  The patient is oriented to person, place, and time. Maintains eye contact     Imaging   abdominal US/anatomy scan  28w, + FHT, Breech position  Outflow tracts not visualized well due to breech position    Assessment/Plan     ASSESSMENT  IUP at 28w0d  High risk pregnancy  Hx of CSection  Hx of severe preeclampsia  Late entry to care    PLAN  1. Tests ordered today (option for genetic screening info given if appropriate):   Orders Placed This Encounter   Procedures   • NuSwab VG+ - Swab, Vagina   • US Ob 14 + Weeks Single or First Gestation     Order Specific Question:   Reason for Exam:     Answer:   anatomy, NOB     2. Medications prescribed today:  No orders of the defined types were placed in this encounter.    3. Information reviewed:exercise in pregnancy, nutrition in pregnancy, weight gain in pregnancy, work and travel restrictions during pregnancy, list of OTC medications acceptable in pregnancy and call coverage groups   4. Discussed severity of BP. Consulted with Dr Rodriguez. Send to  for NST, serial BPs, UDS, UA, NOB labs    Follow up: Will depend on  results         This note was electronically signed.    Milena Saldivar CNM  April 25, 2019

## 2019-04-25 NOTE — PROGRESS NOTES
Alejandra was seen in the office today for her initial visit per Mariela Saldivar CNM.  A full history and physical was done.  She was sent from the office for serial blood pressures and labs.  She did verbalize shock over positive pregnancy.  States she did not know she is pregnant.  She has no complaints of headache spots or visual disturbances.  She denies cramps or abdominal pain.  No vaginal bleeding.  Blood pressures have been all within normal range.  Pulse normal shortly after arrival.  She was on the external fetal monitor.  Fetal heart tones were recorded at 145.  Positive accelerations were seen.  No decelerations.  Labs were drawn and urine sent to lab as well.  CBC is pending, she did have LFTs slightly elevated, her urine is positive drug screen for meth/amphetamines, her urine is also positive with nitrites.  Plan:  Await CBC report  EFM off  Regular diet.  Macrobid 100 mg.  I spoke with her regarding the positive urine drug screen.  She states she did not know she was pregnant.  She has no plans to continue illicit drugs.  I offered counseling and she declined for now.  I will D/C home and office office Monday for glucola.

## 2019-04-26 LAB
HBV SURFACE AG SERPL QL IA: NEGATIVE
HCV AB S/CO SERPL IA: >11 S/CO RATIO (ref 0–0.9)
Lab: NORMAL
RPR SER QL: NORMAL
RUBV IGG SERPL IA-ACNC: 2.78 INDEX
SPECIMEN STATUS: NORMAL

## 2019-04-27 LAB
A VAGINAE DNA VAG QL NAA+PROBE: ABNORMAL SCORE
BACTERIA SPEC AEROBE CULT: ABNORMAL
BVAB2 DNA VAG QL NAA+PROBE: ABNORMAL SCORE
C ALBICANS DNA VAG QL NAA+PROBE: POSITIVE
C GLABRATA DNA VAG QL NAA+PROBE: NEGATIVE
C TRACH RRNA SPEC QL NAA+PROBE: NEGATIVE
HIV1 RNA # SERPL NAA+PROBE: <20 COPIES/ML
LOG10 HIV-1 RNA: NORMAL LOG10COPY/ML
MEGA1 DNA VAG QL NAA+PROBE: ABNORMAL SCORE
N GONORRHOEA RRNA SPEC QL NAA+PROBE: NEGATIVE
T VAGINALIS RRNA SPEC QL NAA+PROBE: NEGATIVE

## 2019-05-01 LAB — HEPATITIS C RNA-NAA: POSITIVE
